# Patient Record
Sex: MALE | Race: BLACK OR AFRICAN AMERICAN | NOT HISPANIC OR LATINO | URBAN - METROPOLITAN AREA
[De-identification: names, ages, dates, MRNs, and addresses within clinical notes are randomized per-mention and may not be internally consistent; named-entity substitution may affect disease eponyms.]

---

## 2017-01-04 ENCOUNTER — HOSPITAL ENCOUNTER (EMERGENCY)
Facility: HOSPITAL | Age: 22
Discharge: HOME | End: 2017-01-04
Attending: EMERGENCY MEDICINE | Admitting: ORTHOPAEDIC SURGERY
Payer: MEDICAID

## 2017-01-04 VITALS
HEIGHT: 70 IN | RESPIRATION RATE: 23 BRPM | HEART RATE: 82 BPM | TEMPERATURE: 99 F | BODY MASS INDEX: 25.77 KG/M2 | OXYGEN SATURATION: 100 % | WEIGHT: 180 LBS | DIASTOLIC BLOOD PRESSURE: 84 MMHG | SYSTOLIC BLOOD PRESSURE: 138 MMHG

## 2017-01-04 DIAGNOSIS — Q71.92: ICD-10-CM

## 2017-01-04 DIAGNOSIS — S53.125A POSTERIOR DISLOCATION OF LEFT ELBOW, INITIAL ENCOUNTER: Primary | ICD-10-CM

## 2017-01-04 DIAGNOSIS — Z72.0 TOBACCO ABUSE: ICD-10-CM
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
## 2017-01-04 DIAGNOSIS — T14.90XA INJURY: ICD-10-CM

## 2017-01-04 PROCEDURE — 99900035 HC TECH TIME PER 15 MIN (STAT)

## 2017-01-04 PROCEDURE — 99900028 HC CONS SEDAT EA 15 MIN (STAT)

## 2017-01-04 PROCEDURE — 99285 EMERGENCY DEPT VISIT HI MDM: CPT | Mod: 57,,, | Performed by: ORTHOPAEDIC SURGERY

## 2017-01-04 PROCEDURE — 24605 TX CLSD ELBOW DISLC REQ ANES: CPT | Mod: LT,,, | Performed by: ORTHOPAEDIC SURGERY

## 2017-01-04 PROCEDURE — 99285 EMERGENCY DEPT VISIT HI MDM: CPT | Mod: 25

## 2017-01-04 PROCEDURE — 25000003 PHARM REV CODE 250: Performed by: EMERGENCY MEDICINE

## 2017-01-04 PROCEDURE — 24600 TX CLSD ELBOW DISLC W/O ANES: CPT | Mod: LT

## 2017-01-04 PROCEDURE — 63600175 PHARM REV CODE 636 W HCPCS: Performed by: EMERGENCY MEDICINE

## 2017-01-04 RX ORDER — HYDROCODONE BITARTRATE AND ACETAMINOPHEN 5; 325 MG/1; MG/1
1 TABLET ORAL EVERY 6 HOURS PRN
Qty: 18 TABLET | Refills: 0 | Status: SHIPPED | OUTPATIENT
Start: 2017-01-04

## 2017-01-04 RX ORDER — KETAMINE HYDROCHLORIDE 10 MG/ML
60 INJECTION, SOLUTION INTRAMUSCULAR; INTRAVENOUS
Status: COMPLETED | OUTPATIENT
Start: 2017-01-04 | End: 2017-01-04

## 2017-01-04 RX ORDER — PROPOFOL 10 MG/ML
60 VIAL (ML) INTRAVENOUS ONCE
Status: COMPLETED | OUTPATIENT
Start: 2017-01-04 | End: 2017-01-04

## 2017-01-04 RX ORDER — KETAMINE HYDROCHLORIDE 10 MG/ML
80 INJECTION, SOLUTION INTRAMUSCULAR; INTRAVENOUS
Status: DISCONTINUED | OUTPATIENT
Start: 2017-01-04 | End: 2017-01-04

## 2017-01-04 RX ORDER — PROPOFOL 10 MG/ML
40 VIAL (ML) INTRAVENOUS ONCE
Status: COMPLETED | OUTPATIENT
Start: 2017-01-04 | End: 2017-01-04

## 2017-01-04 RX ADMIN — KETAMINE HYDROCHLORIDE 60 MG: 10 INJECTION, SOLUTION INTRAMUSCULAR; INTRAVENOUS at 07:01

## 2017-01-04 RX ADMIN — PROPOFOL 40 MG: 10 INJECTION, EMULSION INTRAVENOUS at 07:01

## 2017-01-04 RX ADMIN — PROPOFOL 60 MG: 10 INJECTION, EMULSION INTRAVENOUS at 07:01

## 2017-01-04 NOTE — ED AVS SNAPSHOT
[facility]  [address] [facility]  [place] [address]               Aaron Love   2017  6:07 PM   ED    Description:  Male : 1995   Department:  [facility]            Your Care was Coordinated By:     Provider Role From To    Tana Barton DO Attending Provider 17 [phone] --      Reason for Visit     Joint Swelling           Diagnoses this Visit        Comments    Posterior dislocation of left elbow, initial encounter    -  Primary     Injury         Reduction deformity of left arm         Fall, initial encounter         Elevated blood pressure           ED Disposition     ED Disposition Condition Comment    Discharge             To Do List           Follow-up Information     Follow up with Cuco Patel Sr, MD. Schedule an appointment as soon as possible for a visit in 3 days.    Specialty:  Orthopedic Surgery    Why:  Return to emergency department for numbness of the finger, difficulty awakening, swelling, or other concerns.    Contact information:    [address] [address]  [phone]         These Medications        Disp Refills Start End    hydrocodone-acetaminophen 5-325mg (NORCO) 5-325 mg per tablet 18 tablet 0 2017     Take 1 tablet by mouth every 6 (six) hours as needed for Pain. - Oral      [facility]     [facility] On Call Nurse Care Line -  Assistance  Registered nurses in the [facility] On Call Center provide clinical advisement, health education, appointment booking, and other advisory services.  Call for this free service at [phone].             Medications           Message regarding Medications     Verify the changes and/or additions to your medication regime listed below are the same as discussed with your clinician today.  If any of these changes or additions are incorrect, please notify your healthcare provider.        START taking these NEW medications        Refills     "hydrocodone-acetaminophen 5-325mg (NORCO) 5-325 mg per tablet 0    Sig: Take 1 tablet by mouth every 6 (six) hours as needed for Pain.    Class: Print    Route: Oral      These medications were administered today        Dose Freq    propofol (DIPRIVAN) 10 mg/mL IVP 40 mg Once    Sig: Inject 4 mLs (40 mg total) into the vein once.    Class: Normal    Route: Intravenous    propofol (DIPRIVAN) 10 mg/mL IVP 60 mg Once    Sig: Inject 6 mLs (60 mg total) into the vein once.    Class: Normal    Route: Intravenous    ketamine injection 60 mg 60 mg ED 1 Time    Sig: Inject 6 mLs (60 mg total) into the vein ED 1 Time.    Class: Normal    Route: Intravenous           Verify that the below list of medications is an accurate representation of the medications you are currently taking.  If none reported, the list may be blank. If incorrect, please contact your healthcare provider. Carry this list with you in case of emergency.           Current Medications     hydrocodone-acetaminophen 5-325mg (NORCO) 5-325 mg per tablet Take 1 tablet by mouth every 6 (six) hours as needed for Pain.           Clinical Reference Information           Your Vitals Were     BP Pulse Temp Resp Height Weight    138/84 82 98.8 °F (37.1 °C) (Oral) 23 5' 10" (1.778 m) 81.6 kg (180 lb)    SpO2 BMI             100% 25.83 kg/m2         Allergies as of 1/4/2017     No Known Allergies      Immunizations Administered on Date of Encounter - 1/4/2017     None      ED Micro, Lab, POCT     None      ED Imaging Orders     Start Ordered       Status Ordering Provider    01/04/17 2006 01/04/17 1958  X-Ray Elbow 2 Views Left  1 time imaging      Final result     01/04/17 1959 01/04/17 1958    1 time imaging,   Status:  Canceled      Canceled     01/04/17 1811 01/04/17 1810  X-Ray Elbow Complete Left  1 time imaging      Final result       Discharge References/Attachments     ELBOW DISLOCATION (ENGLISH)    SPLINT CARE, DISCHARGE INSTRUCTIONS (ENGLISH)    SLING (ENGLISH) "    HYPERTENSION, TO BE CONFIRMED (ENGLISH)    HYDROCODONE BITARTRATE, ACETAMINOPHEN ORAL TABLET (ENGLISH)    PROCEDURAL SEDATION (ENGLISH)      [facility] Sign-Up     Activating your [facility] account is as easy as 1-2-3!     1) Visit [email or web address], select Sign Up Now, enter this activation code and your date of birth, then select Next.  [email or web address]  Expires: 2/18/2017  8:32 PM      2) Create a username and password to use when you visit [facility] in the future and select a security question in case you lose your password and select Next.    3) Enter your e-mail address and click Sign Up!    Additional Information  If you have questions, please e-mail [email or web address] or call [phone] to talk to our [facility] staff. Remember, [facility] is NOT to be used for urgent needs. For medical emergencies, dial 911.         Smoking Cessation     If you would like to quit smoking:   You may be eligible for free services if you are a [place] resident and started smoking cigarettes before September 1, 1988.  Call the Smoking Cessation [employer] (SCT) toll free at ([phone] 553-3990 or (212) 726-7370.   Call 0-788-QUIT-NOW if you do not meet the above criteria.             [facility] complies with applicable Federal civil rights laws and does not discriminate on the basis of race, color, national origin, age, disability, or sex.        Language Assistance Services     ATTENTION: Language assistance services are available, free of charge. Please call [phone].      ATENCIÓN: Si habla español, tiene a beckwith disposición servicios gratuitos de asistencia lingüística. Llame al [phone].     CHÚ Ý: N?u b?n nói Ti?ng Vi?t, có các d?ch v? h? tr? ngôn ng? mi?n phí dành cho b?n. G?i s? [phone].

## 2017-01-05 NOTE — ED PROVIDER NOTES
SCRIBE #1 NOTE: I, Quyen Corral, am scribing for, and in the presence of, Tana Barton DO. I have scribed the entire note.      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 male patient who presents to the Emergency Department for L arm injury which onset suddenly 30 minutes ago. Symptoms are constant and moderate in severity. Pt reports that he was running, tried to jump over a ditch, slipped, and fell on her LUE. Pt states that his pain is 10/10 at this moment. Pt denies any head trauma, or any other injury. Pt reports that the last time he ate was around 2 pm today. No mitigating or exacerbating factors reported. No associated sxs reported. Patient denies any LOC, head trauma, numbness, neck pain, back pain, gait problem, and all other sxs at this time. No further complaints or concerns at this time.      Arrival mode: Personal vehicle    PCP: Primary Doctor No       Past Medical History:  History reviewed. No pertinent past medical history.    Past Surgical History:  Past Surgical History   Procedure Laterality Date    Hand surgery           Family History:  History reviewed. No pertinent family history.    Social History:  Social History     Social History Main Topics    Smoking status: Current Every Day Smoker     Types: Cigarettes    Smokeless tobacco: Not given    Alcohol use No    Drug use: Yes     Special: Marijuana    Sexual activity: Not given       ROS   Review of Systems   Constitutional: Negative for fever.   HENT: Negative for sore throat.    Respiratory: Negative for shortness of breath.    Cardiovascular: Negative for chest pain.   Gastrointestinal: Negative for nausea.   Genitourinary: Negative for dysuria.    Musculoskeletal: Positive for arthralgias. Negative for back pain and neck pain.        + LUE injury  +LUE deformity   Skin: Negative for rash.   Neurological: Negative for weakness.   Hematological: Does not bruise/bleed easily.       Physical Exam    Initial Vitals   BP Pulse Resp Temp SpO2   01/04/17 1811 01/04/17 1811 01/04/17 1811 01/04/17 1811 01/04/17 1811   120/78 108 18 98.8 °F (37.1 °C) 99 %      Physical Exam  Nursing Notes and Vital Signs Reviewed.  Constitutional: Patient is in mild distress. Awake and alert. Well-developed and well-nourished.  Head: Atraumatic. Normocephalic.  Eyes: PERRL. EOM intact. Conjunctivae are not pale. No scleral icterus.  ENT: Mucous membranes are moist. Oropharynx is clear and symmetric. Mallampati class III.  No hemotympanum.  Neck: Supple. Full ROM. No lymphadenopathy.  No midline cervical neck tenderness.  Cardiovascular: Regular rate. Regular rhythm. No murmurs, rubs, or gallops. Distal pulses are 2+ and symmetric.  Pulmonary/Chest: No respiratory distress. Clear to auscultation bilaterally. No wheezing, rales, or rhonchi.  Abdominal: Soft and non-distended.  There is no tenderness.  No rebound, guarding, or rigidity.  Good bowel sounds.    Genitourinary: No CVA tenderness  Musculoskeletal: Moves all extremities.  No edema. No calf tenderness.        LUE: Obvious deformity to L elbow. Positive for swelling. Positive for tenderness. ROM is limited. Cap refill distally is <2 seconds. Radial pulses are equal and 2+ bilaterally. No motor deficit. No distal sensory deficit.  Skin: Warm and dry.  Neurological:  Alert, awake, and appropriate.  Normal speech.  No acute focal neurological deficits are appreciated.  Intact to radial/medial/ulnar nerves, both motor and sensory.  Cranial nerves II through XII intact.  Jocelyn Coma Scale 15.  Psychiatric: Normal affect. Good eye contact. Appropriate in content.    ED Course    Procedural Sedation  Date/Time: 1/4/2017 7:45  "PM  Performed by: JOSEPH BRANCH  Authorized by: JOSEPH BRANCH   Consent Done: Yes  Consent: Verbal consent obtained.  Risks and benefits: risks, benefits and alternatives were discussed  Consent given by: patient  Patient understanding: patient states understanding of the procedure being performed  Patient consent: the patient's understanding of the procedure matches consent given  Procedure consent: procedure consent matches procedure scheduled  Relevant documents: relevant documents present and verified  Test results: test results available and properly labeled  Site marked: the operative site was marked  Imaging studies: imaging studies available  Patient identity confirmed: , MRN and name  ASA Class: Class 1 - Heathy patient. No medical history.   Mallampati Score: Class 3 - Visualization of the soft palate and base of the uvula.   Equipment: on BP monitor. and reversal drugs available.   Sedation type: moderate (conscious) sedation  (See MAR for exact dosages of medications).  Sedatives: propofol and ketamine  Sedation start date/time: 2017 7:36 PM  Sedation end date/time: 2017 7:54 PM  Vitals: Vital signs were monitored during sedation.  Complications: No complications.   Comments: Pt did not respond to 100 mg of propofol. Pt was given 60 mg of ketamine. Dr. Patel was able to reduce pt's shoulder.         ED Vital Signs:  Vitals:    17 1811 17 1940 17 1955   BP: 120/78 (!) 145/83 138/84   Pulse: 108 82 82   Resp: 18 19 (!) 23   Temp: 98.8 °F (37.1 °C)     TempSrc: Oral     SpO2: 99% 99% 100%   Weight: 81.6 kg (180 lb)     Height: 5' 10" (1.778 m)         Abnormal Lab Results:  Labs Reviewed - No data to display     All Lab Results:  No results found for this or any previous visit.      Imaging Results:  Imaging Results         X-Ray Elbow 2 Views Left (Final result) Result time:  17 20:16:29    Procedure changed from X-Ray Elbow Complete Left        Final result by " Amadeo Mendieta III, MD (01/04/17 20:16:29)    Impression:     Interval reduction of the elbow dislocation.      Electronically signed by: AMADEO MENDIETA MD  Date:     01/04/17  Time:    20:16     Narrative:    Left elbow x-ray, 2 views.    Clinical indication: Left elbow pain. Dislocation.    Compared to earlier study.    Detail on the current exam is limited by overlying cast/splint. The elbow has been satisfactorily reduced and is now in satisfactory position and alignment. No gross fracture fragment is visualized.            X-Ray Elbow Complete Left (Final result) Result time:  01/04/17 18:37:12    Final result by Amadeo Mendieta III, MD (01/04/17 18:37:12)    Impression:     Posterior dislocation of the radius and ulna at the joint.      Electronically signed by: AMADEO MENDIETA MD  Date:     01/04/17  Time:    18:37     Narrative:    Left elbow x-ray, 3 views.    Clinical indication: Trauma to the elbow.    Posterior dislocation of the low. The radius and ulna are displaced posteriorly and slightly laterally related to the distal humerus. No definite fracture fragment can be visualized.                      The Emergency Provider reviewed the vital signs and test results, which are outlined above.    ED Discussion     7:16 PM: Dr. Barton discussed the pt's case with Dr. Patel (Orthopedics) who states that he will evaluate pt at bedside.    8:53 PM: Dr. Patel was able to reduce pt's elbow. Reassessed pt at this time.  Pt states his condition has improved at this time. Discussed with pt all pertinent ED information and results. Discussed pt dx  and plan of tx. Gave pt all f/u and return to the ED instructions. All questions and concerns were addressed at this time. Pt expresses understanding of information and instructions, and is comfortable with plan to discharge. Pt is stable for discharge.    I discussed with patient and/or family/caretaker that evaluation in the ED does not suggest any  emergent or life threatening medical conditions requiring immediate intervention beyond what was provided in the ED, and I believe patient is safe for discharge.  Regardless, an unremarkable evaluation in the ED does not preclude the development or presence of a serious of life threatening condition. As such, patient was instructed to return immediately for any worsening or change in current symptoms.    Pre-hypertension/Hypertension: The pt has been informed that they may have pre-hypertension or hypertension based on a blood pressure reading in the ED. I recommend that the pt call the PCP listed on their discharge instructions or a physician of their choice this week to arrange f/u for further evaluation of possible pre-hypertension or hypertension.       Trauma precautions were discussed with patient and/or family/caretaker; I do not specifically detect any abdominal, thoracic, CNS, orthopedic, or other emergent or life threatening condition and that patient is safe to be discharged.  It was also discussed that despite an unrevealing examination and negative radiographic examination for serious or life threatening injury, these conditions may still exist.  As such, patient should return to ED immediately should they experience, severe or worsening pain, shortness of breath, abdominal pain, headache, vomiting, or any other concern.  It was also discussed that not infrequently, injuries may not be diagnosed during the initial ED visit (such as fractures) and that if the patient discovers a new area of concern, a new area of injury that was not evaluated in the ED, they should return for evaluation as they may have an injury that requires treatment.    ED Medication(s):  Medications   propofol (DIPRIVAN) 10 mg/mL IVP (40 mg Intravenous Given 1/4/17 1942)   propofol (DIPRIVAN) 10 mg/mL IVP (60 mg Intravenous Given 1/4/17 1940)   ketamine injection 60 mg (60 mg Intravenous Given 1/4/17 1950)       Discharge Medication  List as of 1/4/2017  8:33 PM      START taking these medications    Details   hydrocodone-acetaminophen 5-325mg (NORCO) 5-325 mg per tablet Take 1 tablet by mouth every 6 (six) hours as needed for Pain., Starting 1/4/2017, Until Discontinued, Print             Follow-up Information     Follow up with Cuco Patel Sr, MD. Schedule an appointment as soon as possible for a visit in 3 days.    Specialty:  Orthopedic Surgery    Why:  Return to emergency department for numbness of the finger, difficulty awakening, swelling, or other concerns.    Contact information:    [address] [address] [phone]              Medical Decision Making    Medical Decision Making:   Clinical Tests:   Radiological Study: Ordered and Reviewed           Scribe Attestation:   Scribe #1: I performed the above scribed service and the documentation accurately describes the services I performed. I attest to the accuracy of the note.    Attending:   Physician Attestation Statement for Scribe #1: I, Tana Barton DO, personally performed the services described in this documentation, as scribed by Quyen Corral, in my presence, and it is both accurate and complete.          Clinical Impression       ICD-10-CM ICD-9-CM   1. Posterior dislocation of left elbow, initial encounter S53.125A 832.02   2. Injury T14.90 959.9   3. Reduction deformity of left arm Q71.92 755.20   4. Fall, initial encounter W19.XXXA E888.9   5. Elevated blood pressure I10 401.9   6. Tobacco abuse Z72.0 305.1       Disposition:   Disposition: Discharged  Condition: Stable         Tana Barton DO  01/05/17 0152

## 2017-01-05 NOTE — ED NOTES
Pt obeying commands and answering questions appropriately at this time, awaiting further orders at this time

## 2017-01-05 NOTE — OP NOTE
OPERATIVE DICTATION:    DATE OF SERVICE:01/04/2017      Preoperative Diagnosis:  Left elbow dislocation    Postoperative Diagnosis:   Left elbow dislocation    Procedure: closed reduction of left elbow dislocation    Indication for surgery: left elbow dislocation     Anesthesia:  Propofol and Ketamine    Complications:  none    Surgeon: Cuco Patel M.D.     Specimen:none    Assistant:None      Operative procedure:  A consent was obtained.  The patient was given propofol and ketamine. The left lbow was extended and vertical traction applied.  Pressure was applied to the olecranon in a distal and ulnar direction.  The posterior lateral elbow dislocation noted to reduce quite well.  Cast padding applied to the left upper extremity and a long arm posterior splint applied.  The patient tolerated the procedure well.                 Cuoc Patel M.D.

## 2017-01-05 NOTE — ED PROVIDER NOTES
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 No pertinent past medical history.  No past medical history pertinent negatives.  Past Surgical History   Procedure Laterality Date    Hand surgery       History reviewed. No pertinent family history.  Social History   Substance Use Topics    Smoking status: Current Every Day Smoker     Types: Cigarettes    Smokeless tobacco: None    Alcohol use No     Review of Systems    Physical Exam   Initial Vitals   BP Pulse Resp Temp SpO2   01/04/17 1811 01/04/17 1811 01/04/17 1811 01/04/17 1811 01/04/17 1811   120/78 108 18 98.8 °F (37.1 °C) 99 %     Physical Exam    ED Course   Procedures  Labs Reviewed - No data to display                            ED Course     Clinical Impression:   {Add your Clinical Impression here. If you haven't documented one yet, please pend the note, finalize a Clinical Impression, and refresh your note before signing.:[id number]}

## 2017-01-05 NOTE — CONSULTS
"Consults  Requested by the [facility] lynn and Dr. Barton.    CC:This is a 21 year old male that complains of left elbow pain.      HPI:The patient injured himself in a fall just before being arrested.      PMH:  History reviewed. No pertinent past medical history.    PSH:    Past Surgical History   Procedure Laterality Date    Hand surgery         Family Hx:  History reviewed. No pertinent family history.    Allergy:  Review of patient's allergies indicates:  No Known Allergies    Medication:    Current Facility-Administered Medications:     ketamine injection 80 mg, 80 mg, Intravenous, ED 1 Time, Tana Barton,     propofol (DIPRIVAN) 10 mg/mL IVP, 40 mg, Intravenous, Once, Tana Barton,   No current outpatient prescriptions on file.    Social History:    Social History     Social History    Marital status: N/A     Spouse name: N/A    Number of children: N/A    Years of education: N/A     Occupational History    Not on file.     Social History Main Topics    Smoking status: Current Every Day Smoker     Types: Cigarettes    Smokeless tobacco: Not on file    Alcohol use No    Drug use: Yes     Special: Marijuana    Sexual activity: Not on file     Other Topics Concern    Not on file     Social History Narrative    No narrative on file       Vitals:     Visit Vitals    /78 (BP Location: Right arm, Patient Position: Sitting)    Pulse 108    Temp 98.8 °F (37.1 °C) (Oral)    Resp 18    Ht 5' 10" (1.778 m)    Wt 81.6 kg (180 lb)    SpO2 99%    BMI 25.83 kg/m2        ROS:  GENERAL: No fever, chills, fatigability or weight loss.  SKIN: No rashes, itching or changes in color or texture of skin.  HEAD: No headaches or recent head trauma.  EYES: Visual acuity fine. No photophobia, ocular pain or diplopia.  EARS: Denies ear pain, discharge or vertigo.  NOSE: No loss of smell, no epistaxis or postnasal drip.  MOUTH & THROAT: No hoarseness or change in voice. No excessive gum " bleeding.  NODES: Denies swollen glands.  CHEST: Denies TAMAYO, cyanosis, wheezing, cough and sputum production.  CARDIOVASCULAR: Denies chest pain, PND, orthopnea or reduced exercise tolerance.  ABDOMEN: Appetite fine. No weight loss. Denies diarrhea, abdominal pain, hematemesis or blood in stool.  URINARY: No flank pain, dysuria or hematuria.  PERIPHERAL VASCULAR: No claudication or cyanosis.  NEUROLOGIC: No history of seizures, paralysis, alteration of gait or coordination.  MUSCULOSKELETAL: See HPI    PE:  APPEARANCE: Well nourished, well developed, in no acute distress.   HEAD: Normocephalic, atraumatic.  EYES: PERRL. EOMI.   EARS: TM's intact. Light reflex normal. No retraction or perforation.   NOSE: Mucosa pink. Airway clear.  MOUTH & THROAT: No tonsillar enlargement. No pharyngeal erythema or exudate. No stridor.  NECK: Supple.   NODES: No cervical, axillary or inguinal lymph node enlargement.  CHEST: Lungs clear to auscultation.  CARDIOVASCULAR: Normal S1, S2. No rubs, murmurs or gallops.  ABDOMEN: Bowel sounds normal. Not distended. Soft. No tenderness or masses.  NEUROLOGIC: Cranial Nerves: II-XII grossly intact, also see MUSCULOSKELETAL  MUSCULOSKELETAL:            Left Elbow-2 plus radial  artery and ulnar artery pulses, light touch intact Left upper extremity.  All digits are warm. No erythema, no warmth, no drainage, No swelling, no significant tenderness. The elbow is posterior and laterally dislocated.            Assessment:           Diagnosis:              1.left elbow posterior lateral dislocation                   Diagnostic Studies  MRI-No  X-Ray-No  EMG/NCV-No  Arthrogram-No  Bone Scan-No  CT Scan-No  Doppler-No  ESR-No  CRP-No  CBC with Diff-No   Rheumatoid/Arthritis Panel-No      Plan:                                                 1. PT-no                                                 2.OT-no                                          3.NSAID-no                                        4.  Narcotics-yes                                     5. Wound care-N/A                                 6. Rest-yes                                           7. Surgery-no                                         8. WILI Hose-no                                    9. Anticoagulation therapy-no               10. Elevation-no                                     11. Crutches-no                                    12. Walker-no             13. Cane no                        14. Referral-no                                     15.Injection-no                            16. Splint   /    Cast   /   Cast Shoe-Yes              17. RICE-none            18. Follow up- with [facility], as long as in custody.  Follow up with ortho, within one week.